# Patient Record
Sex: MALE | Race: WHITE | NOT HISPANIC OR LATINO | Employment: FULL TIME | URBAN - METROPOLITAN AREA
[De-identification: names, ages, dates, MRNs, and addresses within clinical notes are randomized per-mention and may not be internally consistent; named-entity substitution may affect disease eponyms.]

---

## 2017-01-06 ENCOUNTER — TELEPHONE (OUTPATIENT)
Dept: SPORTS MEDICINE | Facility: CLINIC | Age: 32
End: 2017-01-06

## 2017-01-06 ENCOUNTER — OFFICE VISIT (OUTPATIENT)
Dept: SPORTS MEDICINE | Facility: CLINIC | Age: 32
End: 2017-01-06

## 2017-01-06 VITALS
WEIGHT: 164 LBS | SYSTOLIC BLOOD PRESSURE: 112 MMHG | BODY MASS INDEX: 28 KG/M2 | HEIGHT: 64 IN | DIASTOLIC BLOOD PRESSURE: 70 MMHG

## 2017-01-06 DIAGNOSIS — M71.22 POPLITEAL CYST, LEFT: ICD-10-CM

## 2017-01-06 DIAGNOSIS — M70.70 ILIOPSOAS BURSITIS: Primary | ICD-10-CM

## 2017-01-06 PROCEDURE — 99213 OFFICE O/P EST LOW 20 MIN: CPT | Performed by: FAMILY MEDICINE

## 2017-01-06 RX ORDER — DICLOFENAC SODIUM 75 MG/1
75 TABLET, DELAYED RELEASE ORAL 2 TIMES DAILY
Qty: 60 TABLET | Refills: 2 | Status: SHIPPED | OUTPATIENT
Start: 2017-01-06 | End: 2018-04-17

## 2017-01-06 NOTE — PROGRESS NOTES
"Emeterio is a 31 y.o. year old male    Chief Complaint   Patient presents with   • Left Knee - Pain   • Left Hip - Pain       History of Present Illness  Here today for left hip pain on and off for about 3 year. Popping, then lingers for the day. No known injury. No radiation. Internal in the hip. Episodic, comes and goes, lasting about a day.       Left knee pain started a few days ago. Posterior, no injury. Worse with repetitive motions. PMH ACL tear (repaired) and meniscus tear (treated with benign neglect). Better with some ibuprofen.     I have reviewed the patient's medical history in detail and updated the computerized patient record.    Review of Systems   Constitutional: Negative.    Musculoskeletal: Positive for arthralgias.   Neurological: Negative.        Visit Vitals   • /70   • Ht 64\" (162.6 cm)   • Wt 164 lb (74.4 kg)   • BMI 28.15 kg/m2        Physical Exam    Vital signs reviewed.   General: No acute distress.  Eyes: conjunctiva clear; pupils equally round and reactive  ENT: external ears and nose atraumatic; oropharynx clear  CV: no peripheral edema, 2+ distal pulses  Resp: normal respiratory effort, no use of accessory muscles  Skin: no rashes or wounds; normal turgor  Psych: mood and affect appropriate; recent and remote memory intact  Neuro: sensation to light touch intact    MSK Exam:  Left hip: Normal appearance. TTP anterior aspect of the joint over the psoas tendon. Normal ROM, pain with AB/ER in the anterior joint - sensation near popping. Normal strength. Pain with extension stretch and resisted flexion.     L knee: Normal appearance. TTP popliteal fossa with some medial fullness. Normal ROM and strength. No instability.     Brief US shows small popliteal cyst.     Diagnoses and all orders for this visit:    Iliopsoas bursitis    Popliteal cyst, left    Other orders  -     diclofenac (VOLTAREN) 75 MG EC tablet; Take 1 tablet by mouth 2 (Two) Times a Day. Take daily for 2 weeks, then " only as needed for pain    Discussed iliopsoas tendonitis/snapping hip syndrome as well as a popliteal cyst likely from his chronic meniscus tear. NSAIDs prn

## 2017-01-06 NOTE — MR AVS SNAPSHOT
Emeterio Arcos   1/6/2017 10:40 AM   Office Visit    Dept Phone:  [phone]   Encounter #:  [id number]    Provider:  James Baeza MD   Department:  [facility] FAMILY AND SPORTS MEDICINE                Your Full Care Plan              Today's Medication Changes          These changes are accurate as of: 1/6/17 12:18 PM.  If you have any questions, ask your nurse or doctor.               New Medication(s)Ordered:     diclofenac 75 MG EC tablet   Commonly known as:  VOLTAREN   Take 1 tablet by mouth 2 (Two) Times a Day. Take daily for 2 weeks, then only as needed for pain   Started by:  James Baeza MD         Stop taking medication(s)listed here:     MethylPREDNISolone 4 MG tablet   Commonly known as:  MEDROL (MARCELLA)   Stopped by:  James Baeza MD           propranolol 20 MG tablet   Commonly known as:  INDERAL   Stopped by:  James Baeza MD                Where to Get Your Medications      These medications were sent to [address] AT [facility] - [phone] [facility] [phone] [place] [address]     Phone:  [phone]     diclofenac 75 MG EC tablet                  Your Updated Medication List          This list is accurate as of: 1/6/17 12:18 PM.  Always use your most recent med list.                desvenlafaxine 50 MG 24 hr tablet   Commonly known as:  PRISTIQ   Take 1 tablet (50 mg total) by mouth daily.       diclofenac 75 MG EC tablet   Commonly known as:  VOLTAREN   Take 1 tablet by mouth 2 (Two) Times a Day. Take daily for 2 weeks, then only as needed for pain               You Were Diagnosed With        Codes Comments    Iliopsoas bursitis    -  Primary ICD-10-CM: M70.70  ICD-9-CM: 726.5     Popliteal cyst, left     ICD-10-CM: M71.22  ICD-9-CM: 727.51       Instructions     None    Patient Instructions History      Upcoming Appointments     Visit Type Date Time  "Department    ESTABLISHED PT - SPORTS MED 2017 10:40 AM [facility] SPORTS MED [facility]      "[employer] Signup     [facility] "[employer] allows you to send messages to your doctor, view your test results, renew your prescriptions, schedule appointments, and more. To sign up, go to [employer] and click on the Sign Up Now link in the New User? box. Enter your "[employer] Activation Code exactly as it appears below along with the last four digits of your Social Security Number and your Date of Birth () to complete the sign-up process. If you do not sign up before the expiration date, you must request a new code.    "[employer] Activation Code: [email or web address]  Expires: 2017 12:18 PM    If you have questions, you can email [email or web address] or call [phone] to talk to our "[employer] staff. Remember, "[employer] is NOT to be used for urgent needs. For medical emergencies, dial 911.               Other Info from Your Visit           Allergies     No Known Allergies      Reason for Visit     Left Knee - Pain     Left Hip - Pain           Vital Signs     Blood Pressure Height Weight Body Mass Index Smoking Status       112/70 64\" (162.6 cm) 164 lb (74.4 kg) 28.15 kg/m2 Former Smoker       Problems and Diagnoses Noted     Bursitis of hip    -  Primary    Baker cyst            "

## 2017-06-05 RX ORDER — DESVENLAFAXINE SUCCINATE 50 MG/1
50 TABLET, EXTENDED RELEASE ORAL DAILY
Qty: 30 TABLET | Refills: 11 | Status: SHIPPED | OUTPATIENT
Start: 2017-06-05 | End: 2017-06-08 | Stop reason: SDUPTHER

## 2017-06-08 RX ORDER — DESVENLAFAXINE SUCCINATE 50 MG/1
50 TABLET, EXTENDED RELEASE ORAL DAILY
Qty: 30 TABLET | Refills: 11 | Status: SHIPPED | OUTPATIENT
Start: 2017-06-08 | End: 2017-06-08 | Stop reason: SDUPTHER

## 2017-06-09 RX ORDER — DESVENLAFAXINE SUCCINATE 50 MG/1
TABLET, EXTENDED RELEASE ORAL
Qty: 30 TABLET | Refills: 4 | Status: SHIPPED | OUTPATIENT
Start: 2017-06-09 | End: 2017-06-09 | Stop reason: SDUPTHER

## 2017-06-09 RX ORDER — DESVENLAFAXINE SUCCINATE 50 MG/1
50 TABLET, EXTENDED RELEASE ORAL DAILY
Qty: 30 TABLET | Refills: 4 | Status: SHIPPED | OUTPATIENT
Start: 2017-06-09 | End: 2017-07-18 | Stop reason: SDUPTHER

## 2017-07-13 ENCOUNTER — TELEPHONE (OUTPATIENT)
Dept: SPORTS MEDICINE | Facility: CLINIC | Age: 32
End: 2017-07-13

## 2017-07-13 RX ORDER — METHYLPREDNISOLONE 4 MG/1
TABLET ORAL
Qty: 21 TABLET | Refills: 0 | Status: SHIPPED | OUTPATIENT
Start: 2017-07-13 | End: 2018-04-17

## 2017-07-13 NOTE — TELEPHONE ENCOUNTER
Pt would like something called in for poison vanessa. I advised Dr. Evans isn't here this afternoon but he is asking if you could possibly send something in. Advised you were seeing patients today and only provider here.

## 2017-07-18 RX ORDER — DESVENLAFAXINE SUCCINATE 50 MG/1
50 TABLET, EXTENDED RELEASE ORAL DAILY
Qty: 30 TABLET | Refills: 4 | Status: SHIPPED | OUTPATIENT
Start: 2017-07-18 | End: 2017-07-25 | Stop reason: SDUPTHER

## 2017-07-25 RX ORDER — DESVENLAFAXINE SUCCINATE 50 MG/1
50 TABLET, EXTENDED RELEASE ORAL DAILY
Qty: 90 TABLET | Refills: 4 | Status: SHIPPED | OUTPATIENT
Start: 2017-07-25 | End: 2018-09-25 | Stop reason: SDUPTHER

## 2018-04-17 ENCOUNTER — OFFICE VISIT (OUTPATIENT)
Dept: SPORTS MEDICINE | Facility: CLINIC | Age: 33
End: 2018-04-17

## 2018-04-17 VITALS
WEIGHT: 149.8 LBS | BODY MASS INDEX: 25.57 KG/M2 | OXYGEN SATURATION: 98 % | HEART RATE: 59 BPM | TEMPERATURE: 97.9 F | HEIGHT: 64 IN | DIASTOLIC BLOOD PRESSURE: 78 MMHG | SYSTOLIC BLOOD PRESSURE: 104 MMHG

## 2018-04-17 DIAGNOSIS — F41.1 GENERALIZED ANXIETY DISORDER: Primary | ICD-10-CM

## 2018-04-17 DIAGNOSIS — F34.1 DYSTHYMIA: ICD-10-CM

## 2018-04-17 DIAGNOSIS — Z30.09 ENCOUNTER FOR VASECTOMY COUNSELING: ICD-10-CM

## 2018-04-17 PROCEDURE — 99213 OFFICE O/P EST LOW 20 MIN: CPT | Performed by: FAMILY MEDICINE

## 2018-04-17 NOTE — PROGRESS NOTES
"Emeterio is a 33 y.o. year old male    Chief Complaint   Patient presents with   • Med Refill       History of Present Illness   HPI   1.  Patient here to discuss possibility of changing his antidepressant antianxiety medicine.  Patient states she's having trouble with attention, was on Vyvanse in high school which was not helpful.  Also his anxiety seems to gotten worse.  No SI or HI.  He does complain of having periods of irritability.  Patient also would like to consider stopping smoking. Started a new job at [employer], is very physical and therefor has lost weight which he is happy about.   2.  Patient and his wife just had their third child, patient will like to be referred for vasectomy.    I have reviewed the patient's medical, family, and social history in detail and updated the computerized patient record.    Review of Systems   Constitutional: Negative.    HENT: Negative.    Respiratory: Negative.    Cardiovascular: Negative.    Psychiatric/Behavioral: Positive for agitation, decreased concentration and dysphoric mood. Negative for behavioral problems, hallucinations, self-injury, sleep disturbance and suicidal ideas. The patient is nervous/anxious. The patient is not hyperactive.        /78 (BP Location: Left arm, Patient Position: Sitting, Cuff Size: Adult)   Pulse 59   Temp 97.9 °F (36.6 °C) (Oral)   Ht 162.6 cm (64.02\")   Wt 67.9 kg (149 lb 12.8 oz)   SpO2 98%   BMI 25.70 kg/m²      Physical Exam   Constitutional: He is oriented to person, place, and time. He appears well-developed and well-nourished.   HENT:   Head: Normocephalic and atraumatic.   Eyes: Conjunctivae and EOM are normal. Pupils are equal, round, and reactive to light.   Neck: Normal range of motion. Neck supple. No thyromegaly present.   Cardiovascular: Normal rate, regular rhythm and normal heart sounds.    No peripheral edema   Pulmonary/Chest: Effort normal and breath sounds normal.   Lymphadenopathy:     He has no cervical " adenopathy.   Neurological: He is alert and oriented to person, place, and time.   Skin: Skin is warm, dry and intact.   Psychiatric: He has a normal mood and affect. His behavior is normal. Thought content normal.   Vitals reviewed.       Diagnoses and all orders for this visit:    Generalized anxiety disorder  -     Ambulatory Referral to Psychiatry    Dysthymia  -     Ambulatory Referral to Psychiatry    Encounter for vasectomy counseling  -     Ambulatory Referral to Urology       I offered Wellbutrin to the patient to see if it would help with his anxiety, tension, help stop smoking: Patient states he has been on this in the past was not helpful.  For that reason we'll refer to psychiatry for further evaluation.      EMR Dragon/Transcription disclaimer:    Much of this encounter note is an electronic transcription/translation of spoken language to printed text.  The electronic translation of spoken language may permit erroneous, or at times, nonsensical words or phrases to be inadvertently transcribed.  Although I have reviewed the note for such errors some may still exist.

## 2018-09-25 RX ORDER — DESVENLAFAXINE SUCCINATE 50 MG/1
TABLET, EXTENDED RELEASE ORAL
Qty: 90 TABLET | Refills: 4 | Status: SHIPPED | OUTPATIENT
Start: 2018-09-25

## 2019-02-27 ENCOUNTER — OFFICE VISIT (OUTPATIENT)
Dept: SPORTS MEDICINE | Facility: CLINIC | Age: 34
End: 2019-02-27

## 2019-02-27 VITALS
HEIGHT: 64 IN | DIASTOLIC BLOOD PRESSURE: 66 MMHG | SYSTOLIC BLOOD PRESSURE: 106 MMHG | WEIGHT: 135 LBS | OXYGEN SATURATION: 97 % | BODY MASS INDEX: 23.05 KG/M2 | HEART RATE: 77 BPM

## 2019-02-27 DIAGNOSIS — F34.1 DYSTHYMIA: Primary | ICD-10-CM

## 2019-02-27 DIAGNOSIS — F41.1 GENERALIZED ANXIETY DISORDER: ICD-10-CM

## 2019-02-27 PROCEDURE — 99214 OFFICE O/P EST MOD 30 MIN: CPT | Performed by: FAMILY MEDICINE

## 2019-02-27 RX ORDER — PROPRANOLOL HYDROCHLORIDE 20 MG/1
TABLET ORAL
Qty: 90 TABLET | Refills: 5 | Status: SHIPPED | OUTPATIENT
Start: 2019-02-27 | End: 2022-04-28

## 2019-02-27 NOTE — PROGRESS NOTES
"Emeterio is a 33 y.o. year old male    Chief Complaint   Patient presents with   • Depression       History of Present Illness   HPI   Patient has dysthymia actually is doing pretty well however he is having episodes of anxiety.  This is becoming an issue at work.  Patient needs FMLA forms completed for work for episodes of anxiety etc.    I have reviewed the patient's medical, family, and social history in detail and updated the computerized patient record.    Review of Systems   Constitutional: Negative for fever.   Musculoskeletal:        Per HPI   Skin: Negative for wound.   Neurological: Negative for numbness.   Psychiatric/Behavioral: Negative for self-injury and suicidal ideas. The patient is nervous/anxious.    All other systems reviewed and are negative.      /66   Pulse 77   Ht 162.6 cm (64\")   Wt 61.2 kg (135 lb)   SpO2 97%   BMI 23.17 kg/m²      Physical Exam   Constitutional: He is oriented to person, place, and time. He appears well-developed and well-nourished.   HENT:   Head: Normocephalic and atraumatic.   Eyes: Conjunctivae and EOM are normal. Pupils are equal, round, and reactive to light.   Cardiovascular:   No peripheral edema   Pulmonary/Chest: Effort normal.   Neurological: He is alert and oriented to person, place, and time.   Skin: Skin is warm and dry.   Psychiatric: His behavior is normal.   Describes episodes of anxiety no SI or HI.  Thought content and judgment appears normal.   Vitals reviewed.        Current Outpatient Medications:   •  desvenlafaxine (PRISTIQ) 50 MG 24 hr tablet, TAKE 1 TABLET DAILY, Disp: 90 tablet, Rfl: 4  •  propranolol (INDERAL) 20 MG tablet, 1/2 to 1 q 8 h prn anxiety attacks, Disp: 90 tablet, Rfl: 5     Diagnoses and all orders for this visit:    Dysthymia    Generalized anxiety disorder  -     propranolol (INDERAL) 20 MG tablet; 1/2 to 1 q 8 h prn anxiety attacks         Completed FMLA forms for work.  Follow-up 1 month if needed.  I spent greater " than 50% of this 25 minute visit discussing the diagnosis, prognosis, treatment plan, etc. Patient's questions were answered in detail with appropriate counseling and anticipatory guidance.     EMR Dragon/Transcription disclaimer:    Much of this encounter note is an electronic transcription/translation of spoken language to printed text.  The electronic translation of spoken language may permit erroneous, or at times, nonsensical words or phrases to be inadvertently transcribed.  Although I have reviewed the note for such errors some may still exist.

## 2022-04-28 ENCOUNTER — OFFICE VISIT (OUTPATIENT)
Dept: INTERNAL MEDICINE | Facility: CLINIC | Age: 37
End: 2022-04-28

## 2022-04-28 VITALS
OXYGEN SATURATION: 98 % | TEMPERATURE: 98.4 F | HEIGHT: 64 IN | DIASTOLIC BLOOD PRESSURE: 82 MMHG | BODY MASS INDEX: 27.91 KG/M2 | HEART RATE: 74 BPM | WEIGHT: 163.5 LBS | SYSTOLIC BLOOD PRESSURE: 138 MMHG

## 2022-04-28 DIAGNOSIS — M25.572 CHRONIC PAIN OF LEFT ANKLE: ICD-10-CM

## 2022-04-28 DIAGNOSIS — F41.1 GENERALIZED ANXIETY DISORDER: Chronic | ICD-10-CM

## 2022-04-28 DIAGNOSIS — Z11.59 NEED FOR HEPATITIS C SCREENING TEST: ICD-10-CM

## 2022-04-28 DIAGNOSIS — F34.1 DYSTHYMIA: Chronic | ICD-10-CM

## 2022-04-28 DIAGNOSIS — F17.200 CURRENT SMOKER: ICD-10-CM

## 2022-04-28 DIAGNOSIS — Z00.00 ANNUAL PHYSICAL EXAM: ICD-10-CM

## 2022-04-28 DIAGNOSIS — Z76.89 ENCOUNTER TO ESTABLISH CARE: Primary | ICD-10-CM

## 2022-04-28 DIAGNOSIS — Z13.220 ENCOUNTER FOR LIPID SCREENING FOR CARDIOVASCULAR DISEASE: ICD-10-CM

## 2022-04-28 DIAGNOSIS — M77.11 LATERAL EPICONDYLITIS OF RIGHT ELBOW: ICD-10-CM

## 2022-04-28 DIAGNOSIS — Z13.6 ENCOUNTER FOR LIPID SCREENING FOR CARDIOVASCULAR DISEASE: ICD-10-CM

## 2022-04-28 DIAGNOSIS — G89.29 CHRONIC PAIN OF LEFT ANKLE: ICD-10-CM

## 2022-04-28 PROCEDURE — 99213 OFFICE O/P EST LOW 20 MIN: CPT | Performed by: NURSE PRACTITIONER

## 2022-04-28 PROCEDURE — 99395 PREV VISIT EST AGE 18-39: CPT | Performed by: NURSE PRACTITIONER

## 2022-04-28 RX ORDER — MELOXICAM 15 MG/1
15 TABLET ORAL DAILY
Qty: 30 TABLET | Refills: 1 | Status: SHIPPED | OUTPATIENT
Start: 2022-04-28

## 2022-04-28 RX ORDER — BUSPIRONE HYDROCHLORIDE 10 MG/1
TABLET ORAL
COMMUNITY
Start: 2022-01-24 | End: 2023-02-24 | Stop reason: SDUPTHER

## 2023-02-24 ENCOUNTER — OFFICE VISIT (OUTPATIENT)
Dept: INTERNAL MEDICINE | Facility: CLINIC | Age: 38
End: 2023-02-24
Payer: COMMERCIAL

## 2023-02-24 VITALS
WEIGHT: 144.4 LBS | TEMPERATURE: 98.4 F | HEART RATE: 55 BPM | DIASTOLIC BLOOD PRESSURE: 80 MMHG | BODY MASS INDEX: 24.65 KG/M2 | HEIGHT: 64 IN | OXYGEN SATURATION: 100 % | SYSTOLIC BLOOD PRESSURE: 124 MMHG

## 2023-02-24 DIAGNOSIS — F41.1 GENERALIZED ANXIETY DISORDER: Chronic | ICD-10-CM

## 2023-02-24 DIAGNOSIS — F34.1 PERSISTENT DEPRESSIVE DISORDER: Primary | Chronic | ICD-10-CM

## 2023-02-24 PROCEDURE — 99213 OFFICE O/P EST LOW 20 MIN: CPT | Performed by: NURSE PRACTITIONER

## 2023-02-24 RX ORDER — BUSPIRONE HYDROCHLORIDE 10 MG/1
10 TABLET ORAL 3 TIMES DAILY
Qty: 90 TABLET | Refills: 1 | Status: SHIPPED | OUTPATIENT
Start: 2023-02-24

## 2023-02-24 RX ORDER — HYDROXYZINE HYDROCHLORIDE 25 MG/1
25 TABLET, FILM COATED ORAL 3 TIMES DAILY PRN
Qty: 30 TABLET | Refills: 0 | Status: SHIPPED | OUTPATIENT
Start: 2023-02-24

## 2023-02-24 NOTE — PROGRESS NOTES
"        Chief Complaint  Anxiety and Depression     Subjective:      History of Present Illness {CC  Problem List  Visit  Diagnosis   Encounters  Notes  Medications  Labs  Result Review Imaging  Media :23}     Emeterio Arcos presents to [facility] for:      Anxiety:     LV: established care.   He chronically has depression/anxiety and is followed by [facility] for pristiq and buspar.   No longer seeing [facility].     Attention problems, impulsiveness.     : 13 years  Wife has moved out.   Financial concerns - will need to sell house.   Financial issues causes rage/anger   Continues to work at [employer]     Seems he restarted a prior relationship with someone he had not seen at [employer] for 4 years.   Now the person is ending relationship - ? Her recent partner.      States he doesn't want to not be in a relationship. But knows he should give his partner time and he should also focus on himself.   Not consistent with medications.     Denies SI \"could not do that, I have responsibilities and that is selfish\"       I have reviewed patient's medical history, any new submitted information provided by patient or medical assistant and updated medical record.      Objective:      Physical Exam  Constitutional:       Appearance: Normal appearance.   Cardiovascular:      Rate and Rhythm: Regular rhythm.      Pulses: Normal pulses.      Heart sounds: Normal heart sounds.   Pulmonary:      Effort: Pulmonary effort is normal.      Breath sounds: Normal breath sounds.   Neurological:      Mental Status: He is alert and oriented to person, place, and time.   Psychiatric:         Mood and Affect: Affect is labile.         Behavior: Behavior is cooperative.         Thought Content: Thought content normal.        Result Review  Data Reviewed:{ Labs  Result Review  Imaging  Med Tab  Media :23}                Vital Signs:   /80 (BP Location: Left arm, Patient Position: " "Sitting, Cuff Size: Adult)   Pulse 55   Temp 98.4 °F (36.9 °C) (Temporal)   Ht 162.6 cm (64\")   Wt 65.5 kg (144 lb 6.4 oz)   SpO2 100%   BMI 24.79 kg/m²         Requested Prescriptions     Signed Prescriptions Disp Refills   • hydrOXYzine (ATARAX) 25 MG tablet 30 tablet 0     Sig: Take 1 tablet by mouth 3 (Three) Times a Day As Needed for Anxiety.   • busPIRone (BUSPAR) 10 MG tablet 90 tablet 1     Sig: Take 1 tablet by mouth 3 (Three) Times a Day.       Routine medications provided by this office will also be refilled via pharmacy request.       Current Outpatient Medications:   •  busPIRone (BUSPAR) 10 MG tablet, Take 1 tablet by mouth 3 (Three) Times a Day., Disp: 90 tablet, Rfl: 1  •  desvenlafaxine (PRISTIQ) 50 MG 24 hr tablet, TAKE 1 TABLET DAILY, Disp: 90 tablet, Rfl: 4  •  meloxicam (Mobic) 15 MG tablet, Take 1 tablet by mouth Daily. Take with food, Disp: 30 tablet, Rfl: 1  •  hydrOXYzine (ATARAX) 25 MG tablet, Take 1 tablet by mouth 3 (Three) Times a Day As Needed for Anxiety., Disp: 30 tablet, Rfl: 0     Assessment and Plan:      Assessment and Plan {CC Problem List  Visit Diagnosis  ROS  Review (Popup)  Health Maintenance  Quality  BestPractice  Medications  SmartSets  SnapShot Encounters  Media :23}     Problem List Items Addressed This Visit        Mental Health    Anxiety disorder (Chronic)    Overview     [facility] prescribes buspar and pristiq         Relevant Medications    hydrOXYzine (ATARAX) 25 MG tablet    busPIRone (BUSPAR) 10 MG tablet    Other Relevant Orders    Ambulatory Referral to Behavioral Health    Depression - Primary (Chronic)    Overview     Prior treatment:  risperdal          Relevant Medications    hydrOXYzine (ATARAX) 25 MG tablet    busPIRone (BUSPAR) 10 MG tablet    Other Relevant Orders    Ambulatory Referral to Behavioral Health     Will add prn atarax: advised it can make him sleepy.   I discussed medication use, dosing and possible side effects. "   Patient was given opportunity to ask any questions.     Follow Up {Instructions Charge Capture  Follow-up Communications :23}     Return if symptoms worsen or fail to improve, for Next scheduled follow up.      Patient was given instructions and counseling regarding his condition or for health maintenance advice. Please see specific information pulled into the AVS if appropriate.    Dragon disclaimer:   Much of this encounter note is an electronic transcription/translation of spoken language to printed text. The electronic translation of spoken language may permit erroneous, or at times, nonsensical words or phrases to be inadvertently transcribed; Although I have reviewed the note for such errors, some may still exist.     Additional Patient Counseling:       There are no Patient Instructions on file for this visit.

## 2023-02-27 RX ORDER — DESVENLAFAXINE SUCCINATE 50 MG/1
TABLET, EXTENDED RELEASE ORAL
Qty: 30 TABLET | OUTPATIENT
Start: 2023-02-27

## 2023-02-27 NOTE — TELEPHONE ENCOUNTER
Rx Refill Note  Requested Prescriptions     Pending Prescriptions Disp Refills   • desvenlafaxine (PRISTIQ) 50 MG 24 hr tablet [Pharmacy Med Name: DESVENLAFAXINE ER SUCCINATE 50MG T] 30 tablet      Sig: TAKE 1 TABLET BY MOUTH EVERY MORNING      Last office visit with prescribing clinician: 2/24/2023   Last telemedicine visit with prescribing clinician: 5/5/2023   Next office visit with prescribing clinician: 5/5/2023                         Would you like a call back once the refill request has been completed: [] Yes [] No    If the office needs to give you a call back, can they leave a voicemail: [] Yes [] No    Brionna Bui  02/27/23, 14:49 EST

## 2023-02-27 NOTE — TELEPHONE ENCOUNTER
"BH attempted to contact patient today:   \"PT STATES HE WILL BE CHECKING HIMSELF INTO THE [facility] 30 DAY PROGRAM THAT WILL MANAGE HIS MEDS AND PROVIDE THERAPY SERVICES. INFORMED PT THAT REFERRAL WILL STAY ON FILE WITH OUR OFFICE FOR FUTURE SCHEDULING. \"    "

## 2023-04-18 DIAGNOSIS — F41.1 GENERALIZED ANXIETY DISORDER: Chronic | ICD-10-CM

## 2023-04-18 DIAGNOSIS — F34.1 PERSISTENT DEPRESSIVE DISORDER: Chronic | ICD-10-CM

## 2023-04-18 RX ORDER — BUSPIRONE HYDROCHLORIDE 10 MG/1
TABLET ORAL
Qty: 90 TABLET | Refills: 0 | Status: SHIPPED | OUTPATIENT
Start: 2023-04-18

## 2023-04-18 NOTE — TELEPHONE ENCOUNTER
Rx Refill Note  Requested Prescriptions     Pending Prescriptions Disp Refills   • busPIRone (BUSPAR) 10 MG tablet [Pharmacy Med Name: BUSPIRONE 10MG TABLETS] 90 tablet 1     Sig: TAKE 1 TABLET BY MOUTH THREE TIMES DAILY      Last office visit with prescribing clinician: 2/24/2023   Last telemedicine visit with prescribing clinician: 7/21/2023   Next office visit with prescribing clinician: 7/21/2023         Courtney Seymour MA  04/18/23, 16:02 EDT

## 2023-05-23 DIAGNOSIS — F34.1 PERSISTENT DEPRESSIVE DISORDER: Primary | Chronic | ICD-10-CM

## 2023-05-23 RX ORDER — DESVENLAFAXINE SUCCINATE 50 MG/1
50 TABLET, EXTENDED RELEASE ORAL DAILY
Qty: 90 TABLET | Refills: 1 | Status: SHIPPED | OUTPATIENT
Start: 2023-05-23

## 2023-05-23 NOTE — TELEPHONE ENCOUNTER
"    Caller: Emeterio Arcos \"Aubrey\"    Relationship: Self    Best call back number: [phone]    Requested Prescriptions:   Requested Prescriptions     Pending Prescriptions Disp Refills   • desvenlafaxine (PRISTIQ) 50 MG 24 hr tablet 90 tablet 4     Sig: Take 1 tablet by mouth Daily.        Pharmacy where request should be sent: [id number] [address] AT [facility] [phone] [facility] [phone]      Last office visit with prescribing clinician: 2/24/2023   Last telemedicine visit with prescribing clinician: Visit date not found   Next office visit with prescribing clinician: 7/21/2023     Additional details provided by patient: PATIENT IS OUT OF MEDICATION     Does the patient have less than a 3 day supply:  [x] Yes  [] No    Would you like a call back once the refill request has been completed: [] Yes [x] No      Sal Lr   05/23/23 14:02 EDT             "

## 2023-05-23 NOTE — TELEPHONE ENCOUNTER
Rx Refill Note  Requested Prescriptions     Pending Prescriptions Disp Refills   • desvenlafaxine (PRISTIQ) 50 MG 24 hr tablet 90 tablet 4     Sig: Take 1 tablet by mouth Daily.      Last office visit with prescribing clinician: 2/24/2023   Last telemedicine visit with prescribing clinician: Visit date not found   Next office visit with prescribing clinician: 7/21/2023         Courtney Seymour MA  05/23/23, 15:52 EDT

## 2024-04-10 DIAGNOSIS — F34.1 PERSISTENT DEPRESSIVE DISORDER: Chronic | ICD-10-CM

## 2024-04-10 DIAGNOSIS — F41.1 GENERALIZED ANXIETY DISORDER: Chronic | ICD-10-CM

## 2024-04-10 RX ORDER — BUSPIRONE HYDROCHLORIDE 10 MG/1
TABLET ORAL
Qty: 270 TABLET | OUTPATIENT
Start: 2024-04-10

## 2024-04-12 DIAGNOSIS — F34.1 PERSISTENT DEPRESSIVE DISORDER: Chronic | ICD-10-CM

## 2024-04-12 RX ORDER — DESVENLAFAXINE SUCCINATE 50 MG/1
50 TABLET, EXTENDED RELEASE ORAL DAILY
Qty: 90 TABLET | Refills: 1 | OUTPATIENT
Start: 2024-04-12